# Patient Record
Sex: MALE | Race: WHITE | Employment: PART TIME | URBAN - METROPOLITAN AREA
[De-identification: names, ages, dates, MRNs, and addresses within clinical notes are randomized per-mention and may not be internally consistent; named-entity substitution may affect disease eponyms.]

---
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
## 2025-03-14 ENCOUNTER — APPOINTMENT (OUTPATIENT)
Dept: RADIOLOGY | Facility: HOSPITAL | Age: 25
End: 2025-03-14
Payer: COMMERCIAL

## 2025-03-14 ENCOUNTER — HOSPITAL ENCOUNTER (EMERGENCY)
Facility: HOSPITAL | Age: 25
Discharge: HOME | End: 2025-03-15
Attending: EMERGENCY MEDICINE
Payer: COMMERCIAL

## 2025-03-14 DIAGNOSIS — E86.0 DEHYDRATION: Primary | ICD-10-CM

## 2025-03-14 DIAGNOSIS — N17.9 AKI (ACUTE KIDNEY INJURY) (CMS-HCC): ICD-10-CM

## 2025-03-14 DIAGNOSIS — R74.8 ELEVATED CK: ICD-10-CM

## 2025-03-14 LAB
ALBUMIN SERPL BCP-MCNC: 5.6 G/DL (ref 3.4–5)
ALP SERPL-CCNC: 58 U/L (ref 33–120)
ALT SERPL W P-5'-P-CCNC: 19 U/L (ref 10–52)
ANION GAP SERPL CALCULATED.3IONS-SCNC: 22 MMOL/L (ref 10–20)
APPEARANCE UR: ABNORMAL
AST SERPL W P-5'-P-CCNC: 28 U/L (ref 9–39)
BASOPHILS # BLD AUTO: 0.12 X10*3/UL (ref 0–0.1)
BASOPHILS NFR BLD AUTO: 0.7 %
BILIRUB SERPL-MCNC: 1.5 MG/DL (ref 0–1.2)
BILIRUB UR STRIP.AUTO-MCNC: NEGATIVE MG/DL
BUN SERPL-MCNC: 27 MG/DL (ref 6–23)
CALCIUM SERPL-MCNC: 11.3 MG/DL (ref 8.6–10.3)
CHLORIDE SERPL-SCNC: 100 MMOL/L (ref 98–107)
CK SERPL-CCNC: 462 U/L (ref 0–325)
CO2 SERPL-SCNC: 20 MMOL/L (ref 21–32)
COLOR UR: YELLOW
CREAT SERPL-MCNC: 2.54 MG/DL (ref 0.5–1.3)
EGFRCR SERPLBLD CKD-EPI 2021: 35 ML/MIN/1.73M*2
EOSINOPHIL # BLD AUTO: 0.01 X10*3/UL (ref 0–0.7)
EOSINOPHIL NFR BLD AUTO: 0.1 %
ERYTHROCYTE [DISTWIDTH] IN BLOOD BY AUTOMATED COUNT: 12.4 % (ref 11.5–14.5)
GLUCOSE SERPL-MCNC: 92 MG/DL (ref 74–99)
GLUCOSE UR STRIP.AUTO-MCNC: NORMAL MG/DL
HCT VFR BLD AUTO: 45.1 % (ref 41–52)
HGB BLD-MCNC: 15.9 G/DL (ref 13.5–17.5)
HYALINE CASTS #/AREA URNS AUTO: ABNORMAL /LPF
IMM GRANULOCYTES # BLD AUTO: 0.07 X10*3/UL (ref 0–0.7)
IMM GRANULOCYTES NFR BLD AUTO: 0.4 % (ref 0–0.9)
KETONES UR STRIP.AUTO-MCNC: ABNORMAL MG/DL
LACTATE SERPL-SCNC: 2 MMOL/L (ref 0.4–2)
LEUKOCYTE ESTERASE UR QL STRIP.AUTO: NEGATIVE
LIPASE SERPL-CCNC: 13 U/L (ref 9–82)
LYMPHOCYTES # BLD AUTO: 2.56 X10*3/UL (ref 1.2–4.8)
LYMPHOCYTES NFR BLD AUTO: 14.4 %
MAGNESIUM SERPL-MCNC: 1.68 MG/DL (ref 1.6–2.4)
MCH RBC QN AUTO: 30.2 PG (ref 26–34)
MCHC RBC AUTO-ENTMCNC: 35.3 G/DL (ref 32–36)
MCV RBC AUTO: 86 FL (ref 80–100)
MONOCYTES # BLD AUTO: 1.36 X10*3/UL (ref 0.1–1)
MONOCYTES NFR BLD AUTO: 7.7 %
MUCOUS THREADS #/AREA URNS AUTO: ABNORMAL /LPF
NEUTROPHILS # BLD AUTO: 13.61 X10*3/UL (ref 1.2–7.7)
NEUTROPHILS NFR BLD AUTO: 76.7 %
NITRITE UR QL STRIP.AUTO: NEGATIVE
NRBC BLD-RTO: 0 /100 WBCS (ref 0–0)
PH UR STRIP.AUTO: 5.5 [PH]
PLATELET # BLD AUTO: 305 X10*3/UL (ref 150–450)
POTASSIUM SERPL-SCNC: 4.1 MMOL/L (ref 3.5–5.3)
PROT SERPL-MCNC: 8.6 G/DL (ref 6.4–8.2)
PROT UR STRIP.AUTO-MCNC: ABNORMAL MG/DL
RBC # BLD AUTO: 5.26 X10*6/UL (ref 4.5–5.9)
RBC # UR STRIP.AUTO: NEGATIVE MG/DL
RBC #/AREA URNS AUTO: ABNORMAL /HPF
SODIUM SERPL-SCNC: 138 MMOL/L (ref 136–145)
SP GR UR STRIP.AUTO: 1.03
SQUAMOUS #/AREA URNS AUTO: ABNORMAL /HPF
UROBILINOGEN UR STRIP.AUTO-MCNC: NORMAL MG/DL
WBC # BLD AUTO: 17.7 X10*3/UL (ref 4.4–11.3)
WBC #/AREA URNS AUTO: ABNORMAL /HPF

## 2025-03-14 PROCEDURE — 96374 THER/PROPH/DIAG INJ IV PUSH: CPT

## 2025-03-14 PROCEDURE — 36415 COLL VENOUS BLD VENIPUNCTURE: CPT | Performed by: EMERGENCY MEDICINE

## 2025-03-14 PROCEDURE — 81001 URINALYSIS AUTO W/SCOPE: CPT | Performed by: EMERGENCY MEDICINE

## 2025-03-14 PROCEDURE — 74176 CT ABD & PELVIS W/O CONTRAST: CPT | Performed by: RADIOLOGY

## 2025-03-14 PROCEDURE — 83605 ASSAY OF LACTIC ACID: CPT | Performed by: EMERGENCY MEDICINE

## 2025-03-14 PROCEDURE — 80053 COMPREHEN METABOLIC PANEL: CPT | Performed by: EMERGENCY MEDICINE

## 2025-03-14 PROCEDURE — 99284 EMERGENCY DEPT VISIT MOD MDM: CPT | Mod: 25 | Performed by: EMERGENCY MEDICINE

## 2025-03-14 PROCEDURE — 83735 ASSAY OF MAGNESIUM: CPT | Performed by: EMERGENCY MEDICINE

## 2025-03-14 PROCEDURE — 2500000004 HC RX 250 GENERAL PHARMACY W/ HCPCS (ALT 636 FOR OP/ED): Performed by: EMERGENCY MEDICINE

## 2025-03-14 PROCEDURE — 84155 ASSAY OF PROTEIN SERUM: CPT | Performed by: EMERGENCY MEDICINE

## 2025-03-14 PROCEDURE — 83690 ASSAY OF LIPASE: CPT | Performed by: EMERGENCY MEDICINE

## 2025-03-14 PROCEDURE — 96361 HYDRATE IV INFUSION ADD-ON: CPT

## 2025-03-14 PROCEDURE — 96360 HYDRATION IV INFUSION INIT: CPT

## 2025-03-14 PROCEDURE — 82550 ASSAY OF CK (CPK): CPT | Performed by: EMERGENCY MEDICINE

## 2025-03-14 PROCEDURE — 74176 CT ABD & PELVIS W/O CONTRAST: CPT

## 2025-03-14 PROCEDURE — 85025 COMPLETE CBC W/AUTO DIFF WBC: CPT | Performed by: EMERGENCY MEDICINE

## 2025-03-14 RX ORDER — ONDANSETRON HYDROCHLORIDE 2 MG/ML
4 INJECTION, SOLUTION INTRAVENOUS ONCE
Status: DISCONTINUED | OUTPATIENT
Start: 2025-03-14 | End: 2025-03-15 | Stop reason: HOSPADM

## 2025-03-14 RX ADMIN — SODIUM CHLORIDE 2000 ML: 900 INJECTION, SOLUTION INTRAVENOUS at 22:28

## 2025-03-14 RX ADMIN — SODIUM CHLORIDE 1000 ML: 9 INJECTION, SOLUTION INTRAVENOUS at 21:04

## 2025-03-14 ASSESSMENT — COLUMBIA-SUICIDE SEVERITY RATING SCALE - C-SSRS
2. HAVE YOU ACTUALLY HAD ANY THOUGHTS OF KILLING YOURSELF?: NO
6. HAVE YOU EVER DONE ANYTHING, STARTED TO DO ANYTHING, OR PREPARED TO DO ANYTHING TO END YOUR LIFE?: NO
1. IN THE PAST MONTH, HAVE YOU WISHED YOU WERE DEAD OR WISHED YOU COULD GO TO SLEEP AND NOT WAKE UP?: NO

## 2025-03-14 ASSESSMENT — PAIN - FUNCTIONAL ASSESSMENT: PAIN_FUNCTIONAL_ASSESSMENT: 0-10

## 2025-03-14 ASSESSMENT — PAIN DESCRIPTION - PAIN TYPE: TYPE: ACUTE PAIN

## 2025-03-14 ASSESSMENT — PAIN DESCRIPTION - DESCRIPTORS: DESCRIPTORS: CRAMPING

## 2025-03-14 ASSESSMENT — PAIN SCALES - GENERAL: PAINLEVEL_OUTOF10: 10 - WORST POSSIBLE PAIN

## 2025-03-14 ASSESSMENT — PAIN DESCRIPTION - LOCATION: LOCATION: OTHER (COMMENT)

## 2025-03-14 ASSESSMENT — PAIN DESCRIPTION - PROGRESSION: CLINICAL_PROGRESSION: NOT CHANGED

## 2025-03-15 VITALS
OXYGEN SATURATION: 99 % | WEIGHT: 213.85 LBS | SYSTOLIC BLOOD PRESSURE: 125 MMHG | BODY MASS INDEX: 26.04 KG/M2 | HEIGHT: 76 IN | RESPIRATION RATE: 15 BRPM | TEMPERATURE: 97.7 F | DIASTOLIC BLOOD PRESSURE: 71 MMHG | HEART RATE: 97 BPM

## 2025-03-15 LAB
ANION GAP SERPL CALCULATED.3IONS-SCNC: 16 MMOL/L (ref 10–20)
BUN SERPL-MCNC: 24 MG/DL (ref 6–23)
CALCIUM SERPL-MCNC: 8.3 MG/DL (ref 8.6–10.3)
CHLORIDE SERPL-SCNC: 107 MMOL/L (ref 98–107)
CK SERPL-CCNC: 447 U/L (ref 0–325)
CO2 SERPL-SCNC: 18 MMOL/L (ref 21–32)
CREAT SERPL-MCNC: 1.57 MG/DL (ref 0.5–1.3)
EGFRCR SERPLBLD CKD-EPI 2021: 62 ML/MIN/1.73M*2
GLUCOSE SERPL-MCNC: 85 MG/DL (ref 74–99)
POTASSIUM SERPL-SCNC: 4 MMOL/L (ref 3.5–5.3)
SODIUM SERPL-SCNC: 137 MMOL/L (ref 136–145)

## 2025-03-15 PROCEDURE — 80048 BASIC METABOLIC PNL TOTAL CA: CPT | Performed by: EMERGENCY MEDICINE

## 2025-03-15 PROCEDURE — 82550 ASSAY OF CK (CPK): CPT | Performed by: EMERGENCY MEDICINE

## 2025-03-15 PROCEDURE — 36415 COLL VENOUS BLD VENIPUNCTURE: CPT | Performed by: EMERGENCY MEDICINE

## 2025-03-15 NOTE — DISCHARGE INSTRUCTIONS
You should drink extra fluids over the coming days.  You should follow-up with a primary care physician for recheck of your creatinine/kidney function.  Return to the emergency department with any worsening symptoms.    It is important to remember that your care does not end here and you must continue to monitor your condition closely. Please return to the emergency department for any worsening or concerning signs or symptoms as directed by our conversations and the discharge instructions. If you do not have a doctor please contact the referral number on your discharge instructions. Please contact any physician specialists provided in your discharge notes as it is very important to follow up with them regarding your condition. If you are unable to reach the physicians provided, please come back to the Emergency Department at any time.    Return to emergency room without delay for ANY new or worsening pains or for any other symptoms or concerns.  Return with worsening pains, nausea, vomiting, trouble breathing, palpitations, shortness of breath, inability to pass stool or urine, loss of control of stool or urine, any numbness or tingling (that is not normal for you), uncontrolled fevers, the passing of blood or other material in stool or urine, rashes, pains or for any other symptoms or concerns you may have.  You are always welcome to return to the ER at any time for any reason or for any other concerns you may have.

## 2025-03-15 NOTE — ED PROVIDER NOTES
Patient received in signout from Dr. Pro.  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  He reports multiple episodes of heatstroke in the past and was concerned that he may have it again.  He states that he did not drink any water today.    On recheck, laboratory studies have significantly improved although not completely normalized.  Patient was advised of this finding and of need for follow-up with primary care physician for recheck of creatinine.  Patient was advised to drink extra fluids.  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  Patient was advised to follow-up and given strict return precautions for any worsening symptoms.  Parts of this chart were completed with dictation software, please excuse any errors in transcription.     Brian Rose MD  03/15/25 0040

## 2025-03-15 NOTE — ED PROVIDER NOTES
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  Pt states he has had heat stroke 4 times in the past and is concerned he has it again.         HISTORY OF PRESENT ILLNESS    Chu Ji is a 25 y.o. who presents to the emergency department with concern for possible heatstroke.  He states he was working on a roof for 13 hours a day in the sun.  He did not drink any water.  He has had symptoms like this previously where he has been \"comatose\".  He finished his job and went to local gas station where he doubled over with abdominal pain and had an episode of emesis.  He now states he feels very lightheaded and achy all over.  No acute trauma.  No other acute complaints.     REVIEW OF SYSTEMS     Focused ROS performed and negative other than as listed in HPI    PAST MEDICAL HISTORY   No past medical history on file.    SURGICAL HISTORY     No past surgical history on file.    CURRENT MEDICATIONS       Previous Medications    No medications on file       ALLERGIES     Patient has no known allergies.    FAMILY HISTORY     No family history on file.     SOCIAL HISTORY       Social History     Socioeconomic History    Marital status: Single     Social Drivers of Health     Food Insecurity: Unknown (12/28/2024)    Received from [facility] Vital Sign     Worried About Running Out of Food in the Last Year: Never true       PHYSICAL EXAM       ED Triage Vitals [03/14/25 2057]   Temperature Heart Rate Respirations BP   36.5 °C (97.7 °F) 92 18 132/78      Pulse Ox Temp Source Heart Rate Source Patient Position   100 % Oral Monitor Lying      BP Location FiO2 (%)     Right arm --        General: Appears uncomfortable but in no acute distress, alert  Head: " Head atraumatic; normocephalic  Eyes: normal inspection; no icterus  ENT: mucosa moist without lesion  Neck: Normal inspection, no meningeal signs  Resp: Normal breath sounds, no wheeze or crackles; No respiratory distress  Chest Wall: no tenderness or deformity  Heart: Heart rate and rhythm regular; No Murmurs  Abdomen: Soft, Non-tender; No distention, guarding, rigidity, or rebound  MSK: Normal appearance; Moves all extremities; No Pedal edema  Neuro: Alert; no focal deficits, moves all extremities  Psych: Mood and Affect normal  Skin: Color appropriate; warm; Dry    DIAGNOSTIC RESULTS   Lab and radiology results are independently interpreted unless noted below.  RADIOLOGY (Per Emergency Physician):     Interpretation per the Radiologist below, if available at the time of this note:  CT abdomen pelvis wo IV contrast    (Results Pending)       LABS:  Abnormal Labs Reviewed   CBC WITH AUTO DIFFERENTIAL - Abnormal; Notable for the following components:       Result Value    WBC 17.7 (*)     Neutrophils Absolute 13.61 (*)     Monocytes Absolute 1.36 (*)     Basophils Absolute 0.12 (*)     All other components within normal limits   COMPREHENSIVE METABOLIC PANEL - Abnormal; Notable for the following components:    Bicarbonate 20 (*)     Anion Gap 22 (*)     Urea Nitrogen 27 (*)     Creatinine 2.54 (*)     eGFR 35 (*)     Calcium 11.3 (*)     Albumin 5.6 (*)     Total Protein 8.6 (*)     Bilirubin, Total 1.5 (*)     All other components within normal limits   CREATINE KINASE - Abnormal; Notable for the following components:    Creatine Kinase 462 (*)     All other components within normal limits       All other labs were within normal range or not returned as of this dictation.      EMERGENCY DEPARTMENT COURSE/MDM   Patient has no elevation in temperature and vital signs are stable.  I do not clinically suspect any sort of heat disease at this time though he is likely dehydrated.  He was already provided a liter of  fluids through EMS.  Another liter will be provided here.  Workup is initiated including CK.  At this time he has no specific abdominal tenderness, the pain is only subjective and therefore we will hold on CT imaging pending lab results.  Patient is agreeable with this plan of care.    ED Course as of 03/14/25 2222   Fri Mar 14, 2025   2114 Patient is technically triggering SIRS with a heart rate of 92 and a white blood cell count of 17.7.  At this time I think this is more secondary to dehydration and underlying cause.  Will defer antibiotics.  I have low suspicion for acute intra-abdominal process, however given the pain and the significant elevation in white count we will obtain a CT scan. [EF]   2213 Patient renal function is significantly elevated.  CK is slightly elevated as well.  Patient is feeling better however on reevaluation after the 2 L of fluids that he was provided (1 by EMS and 1 upon arrival).  Question whether the patient would benefit from admission for continued IV hydration versus continued hydration here.  He is hesitant to be admitted.  Still awaiting CT imaging.  Will continue rehydration orally as well as intravenously and recheck BMP and CK. [EF]      ED Course User Index  [EF] Radha Pro, DO         Diagnoses as of 03/14/25 2222   Dehydration   KAREN (acute kidney injury) (CMS-[facility])   Elevated CK       Meds Administered:  Medications   ondansetron (Zofran) injection 4 mg (4 mg intravenous Not Given 3/14/25 2112)   sodium chloride 0.9 % bolus 2,000 mL (has no administration in time range)   sodium chloride 0.9 % bolus 1,000 mL (0 mL intravenous Stopped 3/14/25 2212)       PROCEDURES   Unless otherwise noted below, none  Procedures      FINAL IMPRESSION      1. Dehydration    2. KAREN (acute kidney injury) (CMS-HCC)    3. Elevated CK          DISPOSITION       Patient was signed out to Dr Rose at 2224 pending completion of their work-up.  Please see the next provider's transition of care  note for the remainder of the patient's care.     Critical Care time: Not Indicated    (Comment: Please note this report has been produced using speech recognition software and may contain errors related to that system including errors in grammar, punctuation, and spelling, as well as words and phrases that may be inappropriate.  If there are any questions or concerns please feel free to contact the dictating provider for clarification.)    Radha Pro DO (electronically signed)  Emergency Medicine Physician    History provided by: Patient and EMS  Limitations to History: None  External Records Reviewed with Brief Summary: outpatient lab results from 2022  Social Determinants of Health which Significantly Impact Care: None identified   EKG Independent Interpretation: EKG not obtained  Independent Result Review and Interpretation: Relevant laboratory and radiographic results were reviewed and independently interpreted by myself.  As necessary, they are commented on in the ED Course.  Chronic conditions affecting the patient's care: As documented above in MDM  The patient was discussed with the following consultants/services: None  Care Considerations: As documented above in MDM               Radha Pro DO  03/14/25 2566

## 2025-05-06 ENCOUNTER — HOSPITAL ENCOUNTER (EMERGENCY)
Facility: HOSPITAL | Age: 25
Discharge: HOME | End: 2025-05-06
Payer: COMMERCIAL

## 2025-05-06 ENCOUNTER — PHARMACY VISIT (OUTPATIENT)
Dept: PHARMACY | Facility: CLINIC | Age: 25
End: 2025-05-06
Payer: COMMERCIAL

## 2025-05-06 ENCOUNTER — APPOINTMENT (OUTPATIENT)
Dept: RADIOLOGY | Facility: HOSPITAL | Age: 25
End: 2025-05-06
Payer: COMMERCIAL

## 2025-05-06 VITALS
WEIGHT: 213 LBS | TEMPERATURE: 99.3 F | OXYGEN SATURATION: 100 % | HEART RATE: 85 BPM | BODY MASS INDEX: 25.94 KG/M2 | SYSTOLIC BLOOD PRESSURE: 173 MMHG | HEIGHT: 76 IN | RESPIRATION RATE: 20 BRPM | DIASTOLIC BLOOD PRESSURE: 64 MMHG

## 2025-05-06 DIAGNOSIS — M54.50 ACUTE BILATERAL LOW BACK PAIN WITHOUT SCIATICA: Primary | ICD-10-CM

## 2025-05-06 LAB
ALBUMIN SERPL BCP-MCNC: 4.5 G/DL (ref 3.4–5)
ALP SERPL-CCNC: 56 U/L (ref 33–120)
ALT SERPL W P-5'-P-CCNC: 13 U/L (ref 10–52)
ANION GAP SERPL CALCULATED.3IONS-SCNC: 14 MMOL/L (ref 10–20)
APPEARANCE UR: CLEAR
AST SERPL W P-5'-P-CCNC: 20 U/L (ref 9–39)
BASOPHILS # BLD AUTO: 0.07 X10*3/UL (ref 0–0.1)
BASOPHILS NFR BLD AUTO: 0.8 %
BILIRUB SERPL-MCNC: 0.4 MG/DL (ref 0–1.2)
BILIRUB UR STRIP.AUTO-MCNC: NEGATIVE MG/DL
BUN SERPL-MCNC: 16 MG/DL (ref 6–23)
CALCIUM SERPL-MCNC: 9.2 MG/DL (ref 8.6–10.3)
CHLORIDE SERPL-SCNC: 105 MMOL/L (ref 98–107)
CO2 SERPL-SCNC: 26 MMOL/L (ref 21–32)
COLOR UR: NORMAL
CREAT SERPL-MCNC: 1.53 MG/DL (ref 0.5–1.3)
EGFRCR SERPLBLD CKD-EPI 2021: 64 ML/MIN/1.73M*2
EOSINOPHIL # BLD AUTO: 0.05 X10*3/UL (ref 0–0.7)
EOSINOPHIL NFR BLD AUTO: 0.6 %
ERYTHROCYTE [DISTWIDTH] IN BLOOD BY AUTOMATED COUNT: 12.5 % (ref 11.5–14.5)
GLUCOSE SERPL-MCNC: 87 MG/DL (ref 74–99)
GLUCOSE UR STRIP.AUTO-MCNC: NORMAL MG/DL
HCT VFR BLD AUTO: 43.3 % (ref 41–52)
HGB BLD-MCNC: 14.6 G/DL (ref 13.5–17.5)
HYALINE CASTS #/AREA URNS AUTO: ABNORMAL /LPF
IMM GRANULOCYTES # BLD AUTO: 0.02 X10*3/UL (ref 0–0.7)
IMM GRANULOCYTES NFR BLD AUTO: 0.2 % (ref 0–0.9)
KETONES UR STRIP.AUTO-MCNC: NEGATIVE MG/DL
LEUKOCYTE ESTERASE UR QL STRIP.AUTO: NEGATIVE
LYMPHOCYTES # BLD AUTO: 1.07 X10*3/UL (ref 1.2–4.8)
LYMPHOCYTES NFR BLD AUTO: 12.8 %
MCH RBC QN AUTO: 30.6 PG (ref 26–34)
MCHC RBC AUTO-ENTMCNC: 33.7 G/DL (ref 32–36)
MCV RBC AUTO: 91 FL (ref 80–100)
MONOCYTES # BLD AUTO: 0.99 X10*3/UL (ref 0.1–1)
MONOCYTES NFR BLD AUTO: 11.9 %
MUCOUS THREADS #/AREA URNS AUTO: ABNORMAL /LPF
NEUTROPHILS # BLD AUTO: 6.15 X10*3/UL (ref 1.2–7.7)
NEUTROPHILS NFR BLD AUTO: 73.7 %
NITRITE UR QL STRIP.AUTO: NEGATIVE
NRBC BLD-RTO: 0 /100 WBCS (ref 0–0)
PH UR STRIP.AUTO: 6 [PH]
PLATELET # BLD AUTO: 209 X10*3/UL (ref 150–450)
POTASSIUM SERPL-SCNC: 3.7 MMOL/L (ref 3.5–5.3)
PROT SERPL-MCNC: 6.8 G/DL (ref 6.4–8.2)
PROT UR STRIP.AUTO-MCNC: NORMAL MG/DL
RBC # BLD AUTO: 4.77 X10*6/UL (ref 4.5–5.9)
RBC # UR STRIP.AUTO: NEGATIVE MG/DL
RBC #/AREA URNS AUTO: ABNORMAL /HPF
SODIUM SERPL-SCNC: 141 MMOL/L (ref 136–145)
SP GR UR STRIP.AUTO: 1.01
UROBILINOGEN UR STRIP.AUTO-MCNC: NORMAL MG/DL
WBC # BLD AUTO: 8.4 X10*3/UL (ref 4.4–11.3)
WBC #/AREA URNS AUTO: ABNORMAL /HPF

## 2025-05-06 PROCEDURE — 84075 ASSAY ALKALINE PHOSPHATASE: CPT

## 2025-05-06 PROCEDURE — 74176 CT ABD & PELVIS W/O CONTRAST: CPT

## 2025-05-06 PROCEDURE — 96375 TX/PRO/DX INJ NEW DRUG ADDON: CPT

## 2025-05-06 PROCEDURE — 2500000004 HC RX 250 GENERAL PHARMACY W/ HCPCS (ALT 636 FOR OP/ED): Mod: JZ

## 2025-05-06 PROCEDURE — RXMED WILLOW AMBULATORY MEDICATION CHARGE

## 2025-05-06 PROCEDURE — 81001 URINALYSIS AUTO W/SCOPE: CPT

## 2025-05-06 PROCEDURE — 99284 EMERGENCY DEPT VISIT MOD MDM: CPT | Mod: 25

## 2025-05-06 PROCEDURE — 36415 COLL VENOUS BLD VENIPUNCTURE: CPT

## 2025-05-06 PROCEDURE — 74176 CT ABD & PELVIS W/O CONTRAST: CPT | Performed by: RADIOLOGY

## 2025-05-06 PROCEDURE — 85025 COMPLETE CBC W/AUTO DIFF WBC: CPT

## 2025-05-06 PROCEDURE — 96374 THER/PROPH/DIAG INJ IV PUSH: CPT

## 2025-05-06 RX ORDER — ONDANSETRON HYDROCHLORIDE 2 MG/ML
4 INJECTION, SOLUTION INTRAVENOUS ONCE
Status: COMPLETED | OUTPATIENT
Start: 2025-05-06 | End: 2025-05-06

## 2025-05-06 RX ORDER — METHOCARBAMOL 500 MG/1
500 TABLET, FILM COATED ORAL 2 TIMES DAILY
Qty: 20 TABLET | Refills: 0 | Status: SHIPPED | OUTPATIENT
Start: 2025-05-06 | End: 2025-05-16

## 2025-05-06 RX ORDER — LIDOCAINE 50 MG/G
1 PATCH TOPICAL DAILY
Qty: 6 PATCH | Refills: 0 | Status: SHIPPED | OUTPATIENT
Start: 2025-05-06

## 2025-05-06 RX ORDER — KETOROLAC TROMETHAMINE 15 MG/ML
15 INJECTION, SOLUTION INTRAMUSCULAR; INTRAVENOUS ONCE
Status: COMPLETED | OUTPATIENT
Start: 2025-05-06 | End: 2025-05-06

## 2025-05-06 RX ORDER — ACETAMINOPHEN 325 MG/1
650 TABLET ORAL EVERY 6 HOURS PRN
Qty: 30 TABLET | Refills: 0 | Status: SHIPPED | OUTPATIENT
Start: 2025-05-06

## 2025-05-06 RX ADMIN — ONDANSETRON 4 MG: 2 INJECTION, SOLUTION INTRAMUSCULAR; INTRAVENOUS at 09:57

## 2025-05-06 RX ADMIN — KETOROLAC TROMETHAMINE 15 MG: 15 INJECTION, SOLUTION INTRAMUSCULAR; INTRAVENOUS at 09:58

## 2025-05-06 ASSESSMENT — PAIN - FUNCTIONAL ASSESSMENT
PAIN_FUNCTIONAL_ASSESSMENT: 0-10
PAIN_FUNCTIONAL_ASSESSMENT: 0-10

## 2025-05-06 ASSESSMENT — COLUMBIA-SUICIDE SEVERITY RATING SCALE - C-SSRS
6. HAVE YOU EVER DONE ANYTHING, STARTED TO DO ANYTHING, OR PREPARED TO DO ANYTHING TO END YOUR LIFE?: NO
2. HAVE YOU ACTUALLY HAD ANY THOUGHTS OF KILLING YOURSELF?: NO
1. IN THE PAST MONTH, HAVE YOU WISHED YOU WERE DEAD OR WISHED YOU COULD GO TO SLEEP AND NOT WAKE UP?: NO

## 2025-05-06 ASSESSMENT — PAIN SCALES - GENERAL
PAINLEVEL_OUTOF10: 10 - WORST POSSIBLE PAIN
PAINLEVEL_OUTOF10: 8

## 2025-05-06 NOTE — Clinical Note
Chu Ji was seen and treated in our emergency department on 5/6/2025.  He may return to work on 05/07/2025.       If you have any questions or concerns, please don't hesitate to call.      Cecelia Bell PA-C

## 2025-05-06 NOTE — ED PROVIDER NOTES
HPI   Chief Complaint   Patient presents with    Back Pain     Pt complains of lower back pain starting this morning. Also has pain in his sides.  Feels like he is going to vomit.        Patient is a 25-year-old male presenting to the emergency department for evaluation of low back pain.  Patient states pain started this morning.  He describes it as a intermittent shooting pain in his back.  Nothing makes it better or worse.  He admits to nausea and an episode of vomiting.  He denies any saddle anesthesias or bowel or bladder incontinence.  He denies any trauma or injury to the back.  He denies any chest pain, shortness of breath, fevers, chills, abdominal pain, cough, congestion, headaches, numbness, tingling, hematuria, dysuria, constipation, diarrhea.              Patient History   Medical History[1]  Surgical History[2]  Family History[3]  Social History[4]    Physical Exam   ED Triage Vitals [05/06/25 0927]   Temperature Heart Rate Respirations BP   37.4 °C (99.3 °F) 85 20 173/64      Pulse Ox Temp src Heart Rate Source Patient Position   100 % -- -- --      BP Location FiO2 (%)     -- --       Physical Exam  Vitals and nursing note reviewed.   Constitutional:       General: He is not in acute distress.     Appearance: Normal appearance. He is not ill-appearing or toxic-appearing.   HENT:      Head: Normocephalic and atraumatic.      Nose: Nose normal.      Mouth/Throat:      Mouth: Mucous membranes are moist.   Eyes:      Extraocular Movements: Extraocular movements intact.      Pupils: Pupils are equal, round, and reactive to light.   Cardiovascular:      Rate and Rhythm: Normal rate and regular rhythm.      Pulses: Normal pulses.   Pulmonary:      Effort: Pulmonary effort is normal.   Abdominal:      Palpations: Abdomen is soft.      Tenderness: There is no abdominal tenderness. There is no right CVA tenderness or left CVA tenderness.   Musculoskeletal:         General: Tenderness (No midline tenderness on  the TLS spine.  No palpable step-offs or deformities.) present. Normal range of motion.      Cervical back: Normal range of motion. No tenderness.   Skin:     General: Skin is warm and dry.      Findings: No erythema.   Neurological:      General: No focal deficit present.      Mental Status: He is alert and oriented to person, place, and time.      Sensory: No sensory deficit.      Motor: No weakness.      Comments: Normal Sensation in the bilateral inner thighs, lower legs, and feet  5/5 strength bilateral abduction/adduction  5/5 strength bilateral flexion and extension of knee  5/5 strength bilateral dorsiflexion and plantar flexion of ankle   5/5 strength plantarflexion of great toes bilaterally  2+ bilateral knee reflexes   Psychiatric:         Mood and Affect: Mood normal.         Behavior: Behavior normal.           ED Course & MDM   Diagnoses as of 05/06/25 1122   Acute bilateral low back pain without sciatica                 No data recorded     [place] Coma Scale Score: 15 (05/06/25 0936 : Zakiya Metcalf RN)                           Medical Decision Making  **Disclaimer parts of this chart have been completed using voice recognition software. Please excuse any errors of transcription.     Evaluated this patient independently and my supervising physician was available for consultation.    HPI: Detailed above.    Exam: A medically appropriate exam performed, outlined above, given the known history and presentation.    History obtained from: Patient    Labs/Diagnostics:  Labs Reviewed   CBC WITH AUTO DIFFERENTIAL - Abnormal       Result Value    WBC 8.4      nRBC 0.0      RBC 4.77      Hemoglobin 14.6      Hematocrit 43.3      MCV 91      MCH 30.6      MCHC 33.7      RDW 12.5      Platelets 209      Neutrophils % 73.7      Immature Granulocytes %, Automated 0.2      Lymphocytes % 12.8      Monocytes % 11.9      Eosinophils % 0.6      Basophils % 0.8      Neutrophils Absolute 6.15      Immature Granulocytes  Absolute, Automated 0.02      Lymphocytes Absolute 1.07 (*)     Monocytes Absolute 0.99      Eosinophils Absolute 0.05      Basophils Absolute 0.07     COMPREHENSIVE METABOLIC PANEL - Abnormal    Glucose 87      Sodium 141      Potassium 3.7      Chloride 105      Bicarbonate 26      Anion Gap 14      Urea Nitrogen 16      Creatinine 1.53 (*)     eGFR 64      Calcium 9.2      Albumin 4.5      Alkaline Phosphatase 56      Total Protein 6.8      AST 20      Bilirubin, Total 0.4      ALT 13     URINALYSIS MICROSCOPIC WITH REFLEX CULTURE - Abnormal    WBC, Urine 1-5      RBC, Urine 1-2      Mucus, Urine FEW      Hyaline Casts, Urine OCCASIONAL (*)    URINALYSIS WITH REFLEX CULTURE AND MICROSCOPIC - Normal    Color, Urine Light-Yellow      Appearance, Urine Clear      Specific Gravity, Urine 1.009      pH, Urine 6.0      Protein, Urine 20 (TRACE)      Glucose, Urine Normal      Blood, Urine NEGATIVE      Ketones, Urine NEGATIVE      Bilirubin, Urine NEGATIVE      Urobilinogen, Urine Normal      Nitrite, Urine NEGATIVE      Leukocyte Esterase, Urine NEGATIVE     URINALYSIS WITH REFLEX CULTURE AND MICROSCOPIC    Narrative:     The following orders were created for panel order Urinalysis with Reflex Culture and Microscopic.  Procedure                               Abnormality         Status                     ---------                               -----------         ------                     Urinalysis with Reflex C...[[id number]]  Normal              Final result               Extra Urine Gray Tube[[id number]]                                                         Please view results for these tests on the individual orders.   EXTRA URINE GRAY TUBE     CT abdomen pelvis wo IV contrast   Final Result   No acute findings in the abdomen or pelvis        No stone anywhere in the urinary tract        No hydroureteronephrosis on either side        Normal appendix        No acute diverticulitis or other colitis, bowel  obstruction,   perforation, abscess or free fluid        No acute pancreatitis, gallbladder or biliary duct dilation        No acute skeletal findings are significant interval change in the   skeletal structures from 14 March 2025 to account for new or   different back pain        MACRO:   None        Signed by: Brian Ordoñez 5/6/2025 10:53 AM   Dictation workstation:   [id number]        EMERGENCY DEPARTMENT COURSE and DIFFERENTIAL DIAGNOSIS/MDM:  Patient is a 25-year-old male presenting to the emergency department for evaluation of lower back pain.  On physical exam vital signs remarkable for systolic hypertension but otherwise stable and patient is in no acute distress.  Patient has no evidence of any neurovascular compromise.  No saddle anesthesias or bowel or bladder incontinence.  Concern for possible kidney stone versus musculoskeletal strain and therefore CT of the abdomen and pelvis without IV contrast ordered, diagnostic labs, urine, IV Toradol, and IV Zofran.  Urine showed no evidence of infection.  CMP showed a creatinine of 1.53 which is consistent with patient's previous labs.  CBC showed no leukocytosis or anemia.  CT of the abdomen and pelvis showed no acute findings in the abdomen or pelvis with no stones anywhere in the urinary tract, no hydroureteronephrosis, normal appendix, no acute diverticulitis, colitis, bowel obstruction, perforation, no acute pancreatitis, and no acute skeletal findings compared to 3/14/2025.  Suspect this is likely musculoskeletal related.  Based on patient history and exam, low red flag score and normal neuro exam of all lumbar and sacral nerve distributions  I have considered but suspect there is a very low risk for cauda equina syndrome, epidural abscess or hematoma, mass lesion, or other infectious process such as discitis, osteomyelitis or transverse myelitis, severe spinal stenosis, or emergent back pathology. Given low risk while I have considered  advanced imaging  "such as MRI or CT of the spine, but given low risk these can be deferred at this time and patient can be managed symptomatically.  Patient was discharged in stable condition with a prescription for Tylenol, Robaxin, lidocaine patches.  He was advised to follow-up with primary care physician outpatient for further management.  He will return to the emergency department with any new or worsening symptoms.  Return precautions discussed.      Vitals:    Vitals:    05/06/25 0927 05/06/25 0936   BP: 173/64    Pulse: 85    Resp: 20    Temp: 37.4 °C (99.3 °F)    SpO2: 100% 100%   Weight: 96.6 kg (213 lb)    Height: 1.93 m (6' 4\")      History Limited by:    None    Independent history obtained from:    None    External records reviewed:    Outpatient Note primary care physician note from 3/27/2025 to determine patient's past medical history    Diagnostics interpreted by me:    CT Scan(s) see MDM    Discussions with other clinicians:    None    Chronic conditions impacting care:    None    Social determinants of health affecting care:    None    Diagnostic tests considered but not performed: None    ED Medications managed:    Medications   ketorolac (Toradol) injection 15 mg (15 mg intravenous Given 5/6/25 5059)   ondansetron (Zofran) injection 4 mg (4 mg intravenous Given 5/6/25 1590)       Prescription drugs considered:     Tylenol, lidocaine patches, Robaxin    Screenings:              Procedure  Procedures       [1] No past medical history on file.  [2] No past surgical history on file.  [3] No family history on file.  [4]   Social History  Tobacco Use    Smoking status: Not on file    Smokeless tobacco: Not on file   Substance Use Topics    Alcohol use: Not on file    Drug use: Not on file        Cecelia Bell PA-C  05/06/25 1122    "

## 2025-05-06 NOTE — DISCHARGE INSTRUCTIONS
Thank you for visiting [facility] emergency department.  It was a pleasure caring for you.    Be sure to take all medications, over the counter medications or prescription medications only as directed.     Be sure to follow up as directed in 1-2 days.  All of the details of your follow up instructions are detailed in the follow up section of this packet.    If you are being discharged with any pains medications or muscle relaxers (norco, Vicodin, hydrocodone products, Percocet, oxycodone products, flexeril, cyclobenzaprine, robaxin, norflex, brand or generic, or any other pain controlling medications with the exception of Ibuprofen and regular Tylenol, do not drive or operate machinery, climb ladders or participate in any activity that could potentially put yourself or others at risk should you get dizzy, or be/feel impaired at all.        It is important to remember that your care does not end here and you must continue to monitor your condition closely. Please return to the emergency department for any worsening or concerning signs or symptoms as directed by our conversations and the discharge instructions. Otherwise please follow up with your doctor in 2 days if no better or worse. If you do not have a doctor please contact the referral number on your discharge instructions. Please contact any physician specialists provided in your discharge notes as it is very important to follow up with them regarding your condition. If you are unable to reach the physicians provided, please come back to the Emergency Department at any time.     As always, please take medications as directed. If you have any questions at all regarding your medications, please contact the pharmacist, the emergency department, or your doctor. Before taking any medication prescribed in the Emergency Department, please review the medication side effects and drug interactions (<http://www.rxlist.com/script/main/hp.asp>) as they may interact with your  home medications.     Having trouble affording medications? Try [email or web address] <[email or web address]/>! (This is not a hospital endorsed website, merely a recommendation based on my own personal experiences with [employer])      Return to emergency room without delay for ANY new or worsening pains or for any other symptoms or concerns.      Return with worsening pains, nausea, vomiting, trouble breathing, palpitations, shortness of breath, inability to pass stool or urine, loss of control of stool or urine, any numbness or tingling (that is not normal for you), uncontrolled fevers, the passing of blood or other material in stool or urine, rashes, pains or for any other symptoms or concerns you may have.  You are always welcome to return to the ER at any time for any reason or for any other concerns you may have.

## 2025-08-05 ENCOUNTER — HOSPITAL ENCOUNTER (EMERGENCY)
Facility: HOSPITAL | Age: 25
Discharge: LEFT BEFORE DISCHARGE | End: 2025-08-05
Payer: COMMERCIAL

## 2025-08-05 VITALS
TEMPERATURE: 99 F | WEIGHT: 223.33 LBS | HEART RATE: 66 BPM | HEIGHT: 76 IN | RESPIRATION RATE: 15 BRPM | BODY MASS INDEX: 27.2 KG/M2

## 2025-08-05 PROCEDURE — 99281 EMR DPT VST MAYX REQ PHY/QHP: CPT

## 2025-08-05 PROCEDURE — 4500999001 HC ED NO CHARGE

## 2025-08-05 ASSESSMENT — PAIN DESCRIPTION - LOCATION: LOCATION: MOUTH

## 2025-08-05 ASSESSMENT — PAIN SCALES - GENERAL: PAINLEVEL_OUTOF10: 8

## 2025-08-05 ASSESSMENT — PAIN DESCRIPTION - PAIN TYPE: TYPE: ACUTE PAIN

## 2025-08-05 ASSESSMENT — PAIN - FUNCTIONAL ASSESSMENT: PAIN_FUNCTIONAL_ASSESSMENT: 0-10

## 2025-08-05 ASSESSMENT — PAIN DESCRIPTION - DESCRIPTORS: DESCRIPTORS: THROBBING;SHARP

## 2025-08-05 ASSESSMENT — PAIN DESCRIPTION - FREQUENCY: FREQUENCY: CONSTANT/CONTINUOUS

## 2025-08-05 ASSESSMENT — PAIN DESCRIPTION - ORIENTATION: ORIENTATION: RIGHT
